# Patient Record
Sex: FEMALE | Race: WHITE | NOT HISPANIC OR LATINO | Employment: FULL TIME | URBAN - METROPOLITAN AREA
[De-identification: names, ages, dates, MRNs, and addresses within clinical notes are randomized per-mention and may not be internally consistent; named-entity substitution may affect disease eponyms.]

---

## 2024-08-22 ENCOUNTER — HOSPITAL ENCOUNTER (OUTPATIENT)
Dept: ULTRASOUND IMAGING | Facility: CLINIC | Age: 34
Discharge: HOME | End: 2024-08-22
Payer: COMMERCIAL

## 2024-08-22 DIAGNOSIS — R10.2 PELVIC PAIN: ICD-10-CM

## 2024-08-22 PROCEDURE — 76830 TRANSVAGINAL US NON-OB: CPT

## 2024-08-22 PROCEDURE — 76856 US EXAM PELVIC COMPLETE: CPT

## 2025-06-17 ENCOUNTER — OFFICE VISIT (OUTPATIENT)
Dept: FAMILY MEDICINE CLINIC | Facility: HOSPITAL | Age: 35
End: 2025-06-17
Payer: COMMERCIAL

## 2025-06-17 VITALS
OXYGEN SATURATION: 99 % | DIASTOLIC BLOOD PRESSURE: 68 MMHG | BODY MASS INDEX: 23.28 KG/M2 | SYSTOLIC BLOOD PRESSURE: 102 MMHG | HEART RATE: 101 BPM | HEIGHT: 68 IN | WEIGHT: 153.6 LBS

## 2025-06-17 DIAGNOSIS — L76.82 PAIN AT SURGICAL INCISION: ICD-10-CM

## 2025-06-17 DIAGNOSIS — R63.4 UNINTENTIONAL WEIGHT LOSS: ICD-10-CM

## 2025-06-17 DIAGNOSIS — R23.3 EASY BRUISABILITY: ICD-10-CM

## 2025-06-17 DIAGNOSIS — R53.1 GENERALIZED WEAKNESS: Primary | ICD-10-CM

## 2025-06-17 DIAGNOSIS — M79.18 MYALGIA, MULTIPLE SITES: ICD-10-CM

## 2025-06-17 DIAGNOSIS — D17.22 BENIGN LIPOMATOUS NEOPLASM OF SKIN AND SUBCUTANEOUS TISSUE OF LEFT ARM: ICD-10-CM

## 2025-06-17 PROBLEM — Z3A.39 39 WEEKS GESTATION OF PREGNANCY: Status: RESOLVED | Noted: 2023-08-23 | Resolved: 2025-06-17

## 2025-06-17 PROBLEM — Z34.03 ENCOUNTER FOR SUPERVISION OF NORMAL FIRST PREGNANCY IN THIRD TRIMESTER: Status: RESOLVED | Noted: 2023-08-23 | Resolved: 2025-06-17

## 2025-06-17 PROCEDURE — 99215 OFFICE O/P EST HI 40 MIN: CPT | Performed by: INTERNAL MEDICINE

## 2025-06-17 NOTE — PROGRESS NOTES
Assessment/Plan:     Diagnosis ICD-10-CM Associated Orders   1. Generalized weakness  R53.1 CBC and differential     Comprehensive metabolic panel     Lipid Panel with Direct LDL reflex     TSH, 3rd generation with Free T4 reflex     Vitamin D 25 hydroxy     Vitamin B12     Vitamin B6     Vitamin C     Vitamin B1, whole blood     APTT     CBC and differential     Comprehensive metabolic panel     Lipid Panel with Direct LDL reflex     TSH, 3rd generation with Free T4 reflex     Vitamin D 25 hydroxy     Vitamin B12     Vitamin B6     Vitamin C     Vitamin B1, whole blood     APTT      2. Myalgia, multiple sites  M79.18 CBC and differential     Comprehensive metabolic panel     Lipid Panel with Direct LDL reflex     TSH, 3rd generation with Free T4 reflex     Vitamin D 25 hydroxy     Vitamin B12     Vitamin B6     Vitamin C     Vitamin B1, whole blood     APTT     CBC and differential     Comprehensive metabolic panel     Lipid Panel with Direct LDL reflex     TSH, 3rd generation with Free T4 reflex     Vitamin D 25 hydroxy     Vitamin B12     Vitamin B6     Vitamin C     Vitamin B1, whole blood     APTT     CBC and Platelet     Comprehensive Metabolic Panel     RHEUMATOID FACTOR     Cyclic citrul peptide antibody, IgG     Sjogren's Antibodies     Uric acid     Lyme Total AB W Reflex to IGM/IGG     C-reactive protein     Sedimentation rate, automated     CBC and Platelet     Comprehensive Metabolic Panel     RHEUMATOID FACTOR     Sjogren's Antibodies     Uric acid     Lyme Total AB W Reflex to IGM/IGG     C-reactive protein     Sedimentation rate, automated     Ambulatory referral to Rheumatology      3. Unintentional weight loss  R63.4 CBC and differential     Comprehensive metabolic panel     TSH, 3rd generation with Free T4 reflex     CBC and differential     Comprehensive metabolic panel     TSH, 3rd generation with Free T4 reflex     Thyroid Antibodies Panel     Thyroid Antibodies Panel      4. Benign lipomatous  neoplasm of skin and subcutaneous tissue of left arm  D17.22       5. Easy bruisability  R23.3 CBC and Platelet     CBC and Platelet     Thyroid Antibodies Panel     Thyroid Antibodies Panel          Problem List Items Addressed This Visit          Musculoskeletal and Integument    Benign lipomatous neoplasm of skin and subcutaneous tissue of left arm       Surgery/Wound/Pain    Myalgia, multiple sites    Relevant Orders    CBC and differential    Comprehensive metabolic panel    Lipid Panel with Direct LDL reflex    TSH, 3rd generation with Free T4 reflex    Vitamin D 25 hydroxy    Vitamin B12    Vitamin B6    Vitamin C    Vitamin B1, whole blood    APTT    CBC and Platelet    Comprehensive Metabolic Panel    RHEUMATOID FACTOR    Cyclic citrul peptide antibody, IgG    Sjogren's Antibodies    Uric acid    Lyme Total AB W Reflex to IGM/IGG    C-reactive protein    Sedimentation rate, automated    Ambulatory referral to Rheumatology       Neurology/Sleep    Generalized weakness - Primary    Relevant Orders    CBC and differential    Comprehensive metabolic panel    Lipid Panel with Direct LDL reflex    TSH, 3rd generation with Free T4 reflex    Vitamin D 25 hydroxy    Vitamin B12    Vitamin B6    Vitamin C    Vitamin B1, whole blood    APTT       Other    Easy bruisability    Relevant Orders    CBC and Platelet    Thyroid Antibodies Panel    Unintentional weight loss    Relevant Orders    CBC and differential    Comprehensive metabolic panel    TSH, 3rd generation with Free T4 reflex    Thyroid Antibodies Panel         Return in about 6 weeks (around 7/29/2025) for Annual physical.      Subjective:    Patient ID: Holly Caba is a 34 y.o. female    Feels like health has decline since delivery of her daughter  in April 2024.   1. Feels like she has decreased energy- overall sleep is ok- will get up to go to bathroom- getting about 7 hours of sleep9 this was her usual pre pregnancy. Generalized joint pains  and back pain- feels like she is fighting an infection- has not had any labs since childbirth.   No family hx of lupus or rheumatoid that she is aware of in family  2. Weight loss- pre pregnancy was usually  165- 170- now 153  In past 2 weeks feels nauseated-- wishes to have another child and not on BC measures  3. Easy bruising- noted that in past 6 months-  from knees down has random ones that she is unaware of any contusions-  sometimes if duaghter is bouncing on legs will have thigh bruising that may take a couple of weeks to heal  4. C section  incision- has ongoing pain and bumpy- had 2 times had us with gyn- will get a small red itchy pain ful bump. Now has irritation around the scar line  5. Heavy menses - increasing recently and  more painful( had cramping in teen age years and was on bcp until late 20's)- has noted strong venkatesh before on bcp and restarted after childbirth- feels it is a confidence  issues now despite being fastidious in cleansing- recommended she call gyn for appt        The following portions of the patient's history were reviewed and updated as appropriate: allergies, current medications and problem list.     Review of Systems   Constitutional:  Positive for fatigue.   HENT:  Negative for congestion and mouth sores.         Has frequent sore throats   More teeth sensitivity   Eyes:         Seen by eye doctor- uses occasional eyeglasses for distance-having some focus issues- seen recently   Respiratory:  Positive for shortness of breath. Negative for cough.    Cardiovascular:  Positive for palpitations. Negative for chest pain.        Some anxiety related palpitations- other times has a few minutes of  faster heart beats that are not triggered    Gastrointestinal:  Negative for constipation and diarrhea.   Endocrine: Positive for polyuria.   Genitourinary:         Fdlmp was may 30   All other systems reviewed and are negative.        Objective:    Current Medications[1]    Blood pressure  "102/68, pulse 101, height 5' 8\" (1.727 m), weight 69.7 kg (153 lb 9.6 oz), SpO2 99%, not currently breastfeeding.     Physical Exam  Vitals and nursing note reviewed.   Constitutional:       General: She is not in acute distress.     Appearance: She is well-developed.   HENT:      Head: Normocephalic and atraumatic.      Right Ear: External ear normal.      Left Ear: External ear normal.      Nose: Nose normal.     Eyes:      Conjunctiva/sclera: Conjunctivae normal.      Pupils: Pupils are equal, round, and reactive to light.     Neck:      Comments: Right neck  trigger points  Cardiovascular:      Rate and Rhythm: Normal rate and regular rhythm.      Heart sounds: Normal heart sounds. No murmur heard.  Pulmonary:      Effort: Pulmonary effort is normal.      Breath sounds: Normal breath sounds.   Abdominal:      General: Bowel sounds are normal.      Palpations: Abdomen is soft.      Tenderness: There is abdominal tenderness.     Musculoskeletal:         General: Tenderness present. Normal range of motion.      Cervical back: Normal range of motion. Rigidity and tenderness present.      Right lower leg: No edema.      Left lower leg: No edema.      Comments: Mid thoracic some paravertebral muscle tenderness   Some posterior knee tenderness     Skin:     General: Skin is warm and dry.      Findings: Bruising present.      Comments: Mild bruising on legs     Neurological:      Mental Status: She is alert and oriented to person, place, and time.      Cranial Nerves: No cranial nerve deficit.      Comments: No tremors   Psychiatric:         Behavior: Behavior normal.         Thought Content: Thought content normal.               [1]  No current outpatient medications on file.  "

## 2025-06-20 ENCOUNTER — RESULTS FOLLOW-UP (OUTPATIENT)
Dept: FAMILY MEDICINE CLINIC | Facility: HOSPITAL | Age: 35
End: 2025-06-20

## 2025-06-20 LAB
ALBUMIN SERPL-MCNC: 4.7 G/DL (ref 3.9–4.9)
ALP SERPL-CCNC: 72 IU/L (ref 44–121)
ALT SERPL-CCNC: 6 IU/L (ref 0–32)
AST SERPL-CCNC: 13 IU/L (ref 0–40)
B BURGDOR IGG+IGM SER QL IA: NEGATIVE
BILIRUB SERPL-MCNC: 0.7 MG/DL (ref 0–1.2)
BUN SERPL-MCNC: 11 MG/DL (ref 6–20)
BUN/CREAT SERPL: 13 (ref 9–23)
CALCIUM SERPL-MCNC: 9.9 MG/DL (ref 8.7–10.2)
CHLORIDE SERPL-SCNC: 102 MMOL/L (ref 96–106)
CO2 SERPL-SCNC: 19 MMOL/L (ref 20–29)
CREAT SERPL-MCNC: 0.88 MG/DL (ref 0.57–1)
CRP SERPL-MCNC: <1 MG/L (ref 0–10)
EGFR: 88 ML/MIN/1.73
ENA SS-A AB SER-ACNC: <0.2 AI (ref 0–0.9)
ENA SS-B AB SER-ACNC: <0.2 AI (ref 0–0.9)
ERYTHROCYTE [DISTWIDTH] IN BLOOD BY AUTOMATED COUNT: 13.1 % (ref 11.7–15.4)
ERYTHROCYTE [SEDIMENTATION RATE] IN BLOOD BY WESTERGREN METHOD: 8 MM/HR (ref 0–32)
GLOBULIN SER-MCNC: 2.7 G/DL (ref 1.5–4.5)
GLUCOSE SERPL-MCNC: 91 MG/DL (ref 70–99)
HCT VFR BLD AUTO: 43.6 % (ref 34–46.6)
HGB BLD-MCNC: 14.6 G/DL (ref 11.1–15.9)
MCH RBC QN AUTO: 31.4 PG (ref 26.6–33)
MCHC RBC AUTO-ENTMCNC: 33.5 G/DL (ref 31.5–35.7)
MCV RBC AUTO: 94 FL (ref 79–97)
PLATELET # BLD AUTO: 164 X10E3/UL (ref 150–450)
POTASSIUM SERPL-SCNC: 4.2 MMOL/L (ref 3.5–5.2)
PROT SERPL-MCNC: 7.4 G/DL (ref 6–8.5)
RBC # BLD AUTO: 4.65 X10E6/UL (ref 3.77–5.28)
RHEUMATOID FACT SERPL-ACNC: <10 IU/ML
SODIUM SERPL-SCNC: 137 MMOL/L (ref 134–144)
THYROGLOB AB SERPL-ACNC: <1 IU/ML (ref 0–0.9)
THYROPEROXIDASE AB SERPL-ACNC: <9 IU/ML (ref 0–34)
URATE SERPL-MCNC: 3.1 MG/DL (ref 2.6–6.2)
WBC # BLD AUTO: 5.4 X10E3/UL (ref 3.4–10.8)

## 2025-06-24 PROBLEM — E55.9 VITAMIN D INSUFFICIENCY: Status: ACTIVE | Noted: 2025-06-24

## 2025-06-24 LAB
25(OH)D3+25(OH)D2 SERPL-MCNC: 22.6 NG/ML (ref 30–100)
ALBUMIN SERPL-MCNC: 4.8 G/DL (ref 3.9–4.9)
ALP SERPL-CCNC: 75 IU/L (ref 44–121)
ALT SERPL-CCNC: 7 IU/L (ref 0–32)
APTT PPP: 27 SEC (ref 24–33)
AST SERPL-CCNC: 11 IU/L (ref 0–40)
BASOPHILS # BLD AUTO: 0 X10E3/UL (ref 0–0.2)
BASOPHILS NFR BLD AUTO: 0 %
BILIRUB SERPL-MCNC: 0.6 MG/DL (ref 0–1.2)
BUN SERPL-MCNC: 12 MG/DL (ref 6–20)
BUN/CREAT SERPL: 13 (ref 9–23)
CALCIUM SERPL-MCNC: 9.5 MG/DL (ref 8.7–10.2)
CHLORIDE SERPL-SCNC: 104 MMOL/L (ref 96–106)
CHOLEST SERPL-MCNC: 182 MG/DL (ref 100–199)
CO2 SERPL-SCNC: 22 MMOL/L (ref 20–29)
CREAT SERPL-MCNC: 0.93 MG/DL (ref 0.57–1)
EGFR: 83 ML/MIN/1.73
EOSINOPHIL # BLD AUTO: 0.1 X10E3/UL (ref 0–0.4)
EOSINOPHIL NFR BLD AUTO: 2 %
ERYTHROCYTE [DISTWIDTH] IN BLOOD BY AUTOMATED COUNT: 12.9 % (ref 11.7–15.4)
GLOBULIN SER-MCNC: 2.7 G/DL (ref 1.5–4.5)
GLUCOSE SERPL-MCNC: 94 MG/DL (ref 70–99)
HCT VFR BLD AUTO: 43.8 % (ref 34–46.6)
HDLC SERPL-MCNC: 63 MG/DL
HGB BLD-MCNC: 14.9 G/DL (ref 11.1–15.9)
IMM GRANULOCYTES # BLD: 0 X10E3/UL (ref 0–0.1)
IMM GRANULOCYTES NFR BLD: 0 %
LDLC SERPL CALC-MCNC: 106 MG/DL (ref 0–99)
LDLC/HDLC SERPL: 1.7 RATIO (ref 0–3.2)
LYMPHOCYTES # BLD AUTO: 1.7 X10E3/UL (ref 0.7–3.1)
LYMPHOCYTES NFR BLD AUTO: 33 %
MCH RBC QN AUTO: 32.2 PG (ref 26.6–33)
MCHC RBC AUTO-ENTMCNC: 34 G/DL (ref 31.5–35.7)
MCV RBC AUTO: 95 FL (ref 79–97)
MONOCYTES # BLD AUTO: 0.4 X10E3/UL (ref 0.1–0.9)
MONOCYTES NFR BLD AUTO: 8 %
NEUTROPHILS # BLD AUTO: 3 X10E3/UL (ref 1.4–7)
NEUTROPHILS NFR BLD AUTO: 57 %
PLATELET # BLD AUTO: 166 X10E3/UL (ref 150–450)
POTASSIUM SERPL-SCNC: 4.3 MMOL/L (ref 3.5–5.2)
PROT SERPL-MCNC: 7.5 G/DL (ref 6–8.5)
RBC # BLD AUTO: 4.63 X10E6/UL (ref 3.77–5.28)
SL AMB VLDL CHOLESTEROL CALC: 13 MG/DL (ref 5–40)
SODIUM SERPL-SCNC: 139 MMOL/L (ref 134–144)
TRIGL SERPL-MCNC: 69 MG/DL (ref 0–149)
TSH SERPL DL<=0.005 MIU/L-ACNC: 1.22 UIU/ML (ref 0.45–4.5)
VIT B1 BLD-SCNC: 115.7 NMOL/L (ref 66.5–200)
VIT B12 SERPL-MCNC: 352 PG/ML (ref 232–1245)
VIT B6 SERPL-MCNC: 9.4 UG/L (ref 3.4–65.2)
VIT C SERPL-MCNC: 0.8 MG/DL (ref 0.4–2)
WBC # BLD AUTO: 5.2 X10E3/UL (ref 3.4–10.8)

## 2025-07-29 ENCOUNTER — OFFICE VISIT (OUTPATIENT)
Dept: FAMILY MEDICINE CLINIC | Facility: HOSPITAL | Age: 35
End: 2025-07-29
Payer: COMMERCIAL

## 2025-07-29 VITALS
HEIGHT: 68 IN | BODY MASS INDEX: 22.73 KG/M2 | WEIGHT: 150 LBS | SYSTOLIC BLOOD PRESSURE: 112 MMHG | HEART RATE: 93 BPM | OXYGEN SATURATION: 97 % | DIASTOLIC BLOOD PRESSURE: 68 MMHG

## 2025-07-29 DIAGNOSIS — Z00.00 ANNUAL PHYSICAL EXAM: Primary | ICD-10-CM

## 2025-07-29 DIAGNOSIS — L74.519 EXCESSIVE SWEATING, LOCAL: ICD-10-CM

## 2025-07-29 DIAGNOSIS — G47.20 DISRUPTED SLEEP-WAKE CYCLE: ICD-10-CM

## 2025-07-29 DIAGNOSIS — F51.8 DISRUPTED SLEEP-WAKE CYCLE: ICD-10-CM

## 2025-07-29 DIAGNOSIS — E55.9 VITAMIN D INSUFFICIENCY: ICD-10-CM

## 2025-07-29 PROCEDURE — 99395 PREV VISIT EST AGE 18-39: CPT | Performed by: INTERNAL MEDICINE

## 2025-07-29 RX ORDER — MULTIVIT-MIN/IRON/FOLIC ACID/K 18-600-40
2000 CAPSULE ORAL
Start: 2025-07-29

## 2025-08-05 ENCOUNTER — TELEPHONE (OUTPATIENT)
Age: 35
End: 2025-08-05